# Patient Record
Sex: MALE | Race: WHITE | NOT HISPANIC OR LATINO | Employment: FULL TIME | URBAN - METROPOLITAN AREA
[De-identification: names, ages, dates, MRNs, and addresses within clinical notes are randomized per-mention and may not be internally consistent; named-entity substitution may affect disease eponyms.]

---

## 2019-02-12 ENCOUNTER — HOSPITAL ENCOUNTER (OUTPATIENT)
Dept: FAMILY MEDICINE CLINIC | Facility: CLINIC | Age: 24
Setting detail: SPECIMEN
Discharge: HOME | End: 2019-02-12
Attending: FAMILY MEDICINE | Admitting: FAMILY MEDICINE

## 2019-02-13 LAB
CONV HIV-1/ HIV-2: NORMAL
CONV HIV-1/ HIV-2: NORMAL
HSV1 IGG SER IA-ACNC: POSITIVE

## 2019-02-14 LAB — HSV1 IGM TITR SER IF: NEGATIVE {TITER}

## 2019-05-09 ENCOUNTER — HOSPITAL ENCOUNTER (OUTPATIENT)
Dept: LAB | Facility: HOSPITAL | Age: 24
Setting detail: SPECIMEN
Discharge: HOME | End: 2019-05-09
Attending: INTERNAL MEDICINE | Admitting: INTERNAL MEDICINE

## 2019-05-11 LAB — CONV HIV-1/ HIV-2: NEGATIVE

## 2020-01-23 ENCOUNTER — OFFICE VISIT (OUTPATIENT)
Dept: FAMILY MEDICINE CLINIC | Facility: CLINIC | Age: 25
End: 2020-01-23

## 2020-01-23 VITALS
OXYGEN SATURATION: 98 % | TEMPERATURE: 99.4 F | HEART RATE: 86 BPM | DIASTOLIC BLOOD PRESSURE: 90 MMHG | SYSTOLIC BLOOD PRESSURE: 120 MMHG | WEIGHT: 196 LBS

## 2020-01-23 DIAGNOSIS — J06.9 ACUTE URI: ICD-10-CM

## 2020-01-23 DIAGNOSIS — J02.9 ACUTE PHARYNGITIS, UNSPECIFIED ETIOLOGY: Primary | ICD-10-CM

## 2020-01-23 LAB
EXPIRATION DATE: NORMAL
EXPIRATION DATE: NORMAL
FLUAV AG NPH QL: NEGATIVE
FLUBV AG NPH QL: NEGATIVE
INTERNAL CONTROL: NORMAL
INTERNAL CONTROL: NORMAL
Lab: NORMAL
Lab: NORMAL
S PYO AG THROAT QL: NEGATIVE

## 2020-01-23 PROCEDURE — 87880 STREP A ASSAY W/OPTIC: CPT | Performed by: NURSE PRACTITIONER

## 2020-01-23 PROCEDURE — 99213 OFFICE O/P EST LOW 20 MIN: CPT | Performed by: NURSE PRACTITIONER

## 2020-01-23 PROCEDURE — 87804 INFLUENZA ASSAY W/OPTIC: CPT | Performed by: NURSE PRACTITIONER

## 2020-01-23 NOTE — PROGRESS NOTES
Subjective   Roel Calderon is a 24 y.o. male.     Chief Complaint   Patient presents with   • Sinusitis   • Fever       /90 (BP Location: Left arm, Patient Position: Sitting, Cuff Size: Adult)   Pulse 86   Temp 99.4 °F (37.4 °C) (Oral)   Wt 88.9 kg (196 lb)   SpO2 98%     BP Readings from Last 3 Encounters:   01/23/20 120/90       Wt Readings from Last 3 Encounters:   01/23/20 88.9 kg (196 lb)       Pt comes in today with c/o body aches, chills, fever, cough, sore throat, and nasal congestion. Symptoms started 1.5 days ago.   Took some Advil otc.   Did not get FLU vaccine.        The following portions of the patient's history were reviewed and updated as appropriate: allergies, current medications, past family history, past medical history, past social history, past surgical history and problem list.    Review of Systems   Constitutional: Positive for chills. Negative for fever.   HENT: Positive for congestion, postnasal drip and sore throat. Negative for ear pain, rhinorrhea, sinus pressure, sneezing and swollen glands.    Respiratory: Positive for cough. Negative for chest tightness, shortness of breath and wheezing.    Gastrointestinal: Negative for nausea and vomiting.   Musculoskeletal: Positive for myalgias.   Neurological: Positive for headache.       Objective   Physical Exam   Constitutional: He is oriented to person, place, and time. He appears well-developed and well-nourished.   HENT:   Right Ear: Tympanic membrane normal.   Left Ear: Tympanic membrane normal.   Nose: Nose normal.   Mouth/Throat: Posterior oropharyngeal erythema present.   Eyes: Pupils are equal, round, and reactive to light.   Cardiovascular: Normal rate and regular rhythm.   Pulmonary/Chest: Effort normal and breath sounds normal.   Neurological: He is alert and oriented to person, place, and time.         Diagnoses and all orders for this visit:    1. Acute pharyngitis, unspecified etiology (Primary)  -     POC Influenza A /  B  -     POCT rapid strep A    2. Acute URI  -     POC Influenza A / B  -     POCT rapid strep A    FLU NEG  RSS neg  Push fluids  Tylenol/IBU   During this office visit, we discussed the pertinent aspects of the visit and treatment recommendations. Pt verbalizes understanding. Follow up was discussed. Patient was given the opportunity to ask questions and discuss other concerns.       No follow-ups on file.